# Patient Record
Sex: MALE | Race: WHITE | URBAN - METROPOLITAN AREA
[De-identification: names, ages, dates, MRNs, and addresses within clinical notes are randomized per-mention and may not be internally consistent; named-entity substitution may affect disease eponyms.]

---

## 2017-01-11 DIAGNOSIS — H91.90 HEARING LOSS: Primary | ICD-10-CM

## 2017-01-18 ENCOUNTER — OFFICE VISIT (OUTPATIENT)
Dept: AUDIOLOGY | Facility: CLINIC | Age: 82
End: 2017-01-18

## 2017-01-18 DIAGNOSIS — H90.3 SENSORY HEARING LOSS, BILATERAL: Primary | ICD-10-CM

## 2017-01-18 NOTE — PROGRESS NOTES
AUDIOLOGY REPORT    BACKGROUND INFORMATION: Dr. Homer Berger implanted Frantz Peña with a right  Advanced [employer]ion[employer] HiRes 90K cochlear implant (CI) on 5/22/2008 due to severe to profound sensorineural hearing loss bilaterally and lack of benefit from hearing aids. The patient is being seen for cochlear implant programming on 1/18/2017 in Audiology at the [facility].  The patient was accompanied to today's appointment by his son, Brian.    PATIENT REPORT: Frantz reports that he hasn't been understanding well for several months.  He would like an equipment check and programming.  He has not changed the filters on his microphones, since it was completed at last March's appointment.  He was hospitalized in late 2016 due to a UTI and was on prednisone during his recovery.        FITTING SESSION: Dr. Homer Berger, cochlear implant surgeon, ordered today's appointment. The patient came to the clinic for adjustment to the programs in the external speech processor and for assessment of the external components of the cochlear implant system. These components provide power and data to the internal device. Sound is only heard once the external portion is activated. Postoperative treatment, including device fitting and adjustment, audiologic assessments, and training are required at regular intervals. Testing can include electropsychophysical measures of threshold, comfort, and loudness balancing, which are completed to update the program.    Processor type: [place] CI Q70 - changed microphone filters in the office today; PSP back-up (did not bring today)  Headpiece type: Universal headpiece (UHP) for [place] and PHP for PSP  Magnet strength: UHP - Thin magnet only without irritation    TEST RESULTS:   Electrode Impedances: Stable and within tolerances  Neural Response Testing: Did not test today  Facial Stimulation: Absent  Tinnitus: Absent  Balance Problems: Absent  Pain/Discomfort:  Absent  Strategies Tried: HiRes [place] S    Programs:  1. (48) HiRes Optima S, processor sahley + T-ashley; Clear Voice medium  2. (49) HiRes Optima S, Ultra Zoom; Clear Voice medium  The T-ashley only program was previously removed to simplify equipment use at patient request.     Number of Channels per Program: 13 (electrodes 14-16 are off)    COMMENTS: Prior to changing microphone filters, the processor was slightly muffled.  This improved after changing the filters.  Most comfortable (M) levels were measured using tone bursts using a simplified 3 point loudness scale.  The program was significantly reshaped (stable in lows, increased in mids, decreased in highs slightly).  After the changes, the patient requested a global volume increase of 8 units on all electrodes.  Patient reported clearer speech and a more comfortable volume after the changes.  Patient not interested in WindBlock or SoundRelax.  Patient was fatigued so we did not complete speech perception testing today.        SUMMARY AND RECOMMENDATIONS: Frantz was seen for cochlear implant programming and volume was increased.  Patient will return for speech perception testing at his convenience and further programming as needed.  The patient and his son expressed understanding and agreement with this plan.     Telma Ji, CCC-A  Licensed Audiologist  [place] #[id number]

## 2017-10-04 ENCOUNTER — OFFICE VISIT (OUTPATIENT)
Dept: AUDIOLOGY | Facility: CLINIC | Age: 82
End: 2017-10-04

## 2017-10-04 DIAGNOSIS — H90.3 SENSORY HEARING LOSS, BILATERAL: Primary | ICD-10-CM

## 2017-10-04 NOTE — PROGRESS NOTES
AUDIOLOGY REPORT    BACKGROUND INFORMATION: Dr. Homer Berger implanted Frantz Peña with a right  Advanced Bionics HiRes 90K cochlear implant (CI) on 5/22/2008 due to severe to profound sensorineural hearing loss bilaterally and lack of benefit from hearing aids. The patient is being seen for cochlear implant programming on 10/4/2017 in Audiology at the [facility].  The patient was accompanied to today's appointment by his son, Brian.    PATIENT REPORT: Frantz reports that he hasn't been understanding well for several months.  He would like an equipment check and programming.  He has not changed the filters on his microphones, since it was completed at last January's appointment.         FITTING SESSION: Dr. Homer Berger, cochlear implant surgeon, ordered today's appointment. The patient came to the clinic for adjustment to the programs in the external speech processor and for assessment of the external components of the cochlear implant system. These components provide power and data to the internal device. Sound is only heard once the external portion is activated. Postoperative treatment, including device fitting and adjustment, audiologic assessments, and training are required at regular intervals. Testing can include electropsychophysical measures of threshold, comfort, and loudness balancing, which are completed to update the program.    Processor type: [place] CI Q70 - changed microphone filters in the office today; PSP back-up (did not bring today)  Headpiece type: Universal headpiece (UHP) for [place] and PHP for PSP  Magnet strength: UHP - Thin magnet only without irritation    TEST RESULTS:   Electrode Impedances: Stable and within tolerances  Neural Response Testing: Did not test today  Facial Stimulation: Absent  Tinnitus: Absent  Balance Problems: Absent  Pain/Discomfort: Absent  Strategies Tried: HiRes [place] S    Programs:  1. (52) HiRes Optima S,  processor ashley + T-ashley; Clear Voice medium  2. (53) [place] S, Ultra Zoom; Clear Voice medium  The T-ashley only program was previously removed to simplify equipment use at patient request.     Number of Channels per Program: 13 (electrodes 14-16 are off)    COMMENTS: Most comfortable (M) levels were measured using tone bursts using a simplified 3 point loudness scale.  The program was significantly reshaped.  While it sounded comfortable on the programming cable, it did not sound as clear on the patient's battery.  We then adjusted the program to increase IDR from 60 to 70 and globally increased M levels 4 units.  On the battery, the sound quality was significantly improved. Patient reports poor battery life, inconsistent battery life and decreased sound quality at the end of a battery's charge.  Therefore, he was encouraged to order new batteries from [employer].  Patient was fatigued so we did not complete speech perception testing today.        SUMMARY AND RECOMMENDATIONS: Frantz was seen for cochlear implant programming and volume and IDR were increased.  Patient was encouraged to order new batteries through [employer].  Patient will return for speech perception testing at his convenience and further programming as needed.  The patient and his son expressed understanding and agreement with this plan.     Telma Ji, CCC-A  Licensed Audiologist  [place] #[id number]

## 2017-10-04 NOTE — MR AVS SNAPSHOT
After Visit Summary   10/4/2017    Frantz Peña    MRN: [id number]           Patient Information     Date Of Birth          1925        Visit Information        Provider Department      10/4/2017 2:30 PM Su Schmitz, Noris OWEN [facility] Audiology        Today's Diagnoses     Sensory hearing loss, bilateral    -  1       Follow-ups after your visit        Who to contact     Please call your clinic at [phone] to:    Ask questions about your health    Make or cancel appointments    Discuss your medicines    Learn about your test results    Speak to your doctor   If you have compliments or concerns about an experience at your clinic, or if you wish to file a complaint, please contact [facility] Patient Relations at [phone] or email us at [email or web address]         Additional Information About Your Visit        MyChart Information     [employer] [place] is an electronic gateway that provides easy, online access to your medical records. With [employer] [place], you can request a clinic appointment, read your test results, renew a prescription or communicate with your care team.     To sign up for [employer] [place] visit the website at [email or web address]   You will be asked to enter the access code listed below, as well as some personal information. Please follow the directions to create your username and password.     Your access code is: [email or web address]  Expires: 2017  6:30 AM     Your access code will  in 90 days. If you need help or a new code, please contact your [facility] or call [phone] for assistance.        Care EveryWhere ID     This is your Care EveryWhere ID. This could be used by other organizations to access your [place] medical records  [id number]         Blood Pressure from Last 3 Encounters:   12 167/83   12 172/88    Weight from Last 3 Encounters:   12 106.6 kg (235 lb)              We Performed  the Following     RT: Diagnostic Analysis of CI 7 yrs & over, Subsequent Programming   ([id number])        Primary Care Provider Office Phone # Fax #    Haley Albarado [phone]       [facility] [address] [address]        Equal Access to Services     YUKO CEDEÑO : Hadii aad ku hadasho Soomaali, waaxda luqadaha, qaybta kaalmada adeegyada, waxay compain haymigdalian adeodalis barrios goldie dodd. So [facility] [phone].    ATENCIÓN: Si habla español, tiene a watson disposición servicios gratuitos de asistencia lingüística. Llame al [phone].    We comply with applicable federal civil rights laws and [place] laws. We do not discriminate on the basis of race, color, national origin, age, disability, sex, sexual orientation, or gender identity.            Thank you!     Thank you for choosing [facility] AUDIOLOGY  for your care. Our goal is always to provide you with excellent care. Hearing back from our patients is one way we can continue to improve our services. Please take a few minutes to complete the written survey that you may receive in the mail after your visit with us. Thank you!             Your Updated Medication List - Protect others around you: Learn how to safely use, store and throw away your medicines at www.disposemymeds.org.          This list is accurate as of: 10/4/17  3:31 PM.  Always use your most recent med list.                   Brand Name Dispense Instructions for use Diagnosis    adalimumab 40 MG/0.8ML injection    Humira     Inject 40 mg Subcutaneous. Every 2 weeks        albuterol 108 (90 BASE) MCG/ACT Inhaler    PROAIR HFA/PROVENTIL HFA/VENTOLIN HFA     Inhale 2 puffs into the lungs every 6 hours as needed.        ASPIRIN PO      Take 81 mg by mouth daily.        ATORVASTATIN CALCIUM PO      Take 10 mg by mouth daily.        BETA CAROTENE PO      Take 25,000 Units by mouth daily.        DEMADEX PO      Take 20 mg by mouth every 48 hours.        FOLIC ACID PO       Take 400 mcg by mouth daily.        GABAPENTIN PO      Take 300 mg by mouth. 2 capsules once daily        garlic 150 MG Tabs tablet      Take 500 mg by mouth daily.        GINKGO BILOBA MEMORY ENHANCER PO      Take 60 mg by mouth daily.        HumuLIN MIX 70/30 PEN injection   Generic drug:  insulin NPH-insulin regular      Inject  Subcutaneous See Admin Instructions. 24 units before breakfast        ketoconazole 2 % cream    NIZORAL     Apply 0.5 inches topically daily.        LEVOTHYROXINE SODIUM PO      Take 75 mcg by mouth daily.        lidocaine 5 % Patch    LIDODERM     Place 1 patch onto the skin every 24 hours.        LOSARTAN POTASSIUM PO      Take 50 mg by mouth daily.        MAGNESIUM OXIDE PO      Take 400 mg by mouth daily.        METOPROLOL SUCCINATE PO      Take 50 mg by mouth daily.        OMEPRAZOLE      20 mg 2 times daily.        SINGULAIR PO      Take 10 mg by mouth At Bedtime.        travoprost Z (benzalkonium) 0.004 % ophthalmic solution    TRAVATAN Z     Place 1 drop into both eyes At Bedtime.        VITAMIN B-6 PO      Take 100 mg by mouth daily.        zinc 50 MG Tabs      Take  by mouth daily.

## 2018-02-02 ENCOUNTER — RECORDS - HEALTHEAST (OUTPATIENT)
Dept: LAB | Facility: CLINIC | Age: 83
End: 2018-02-02

## 2018-02-02 LAB
ANION GAP SERPL CALCULATED.3IONS-SCNC: 9 MMOL/L (ref 5–18)
BUN SERPL-MCNC: 40 MG/DL (ref 8–28)
CALCIUM SERPL-MCNC: 9.7 MG/DL (ref 8.5–10.5)
CHLORIDE BLD-SCNC: 107 MMOL/L (ref 98–107)
CO2 SERPL-SCNC: 17 MMOL/L (ref 22–31)
CREAT SERPL-MCNC: 2.11 MG/DL (ref 0.7–1.3)
GFR SERPL CREATININE-BSD FRML MDRD: 30 ML/MIN/1.73M2
GLUCOSE BLD-MCNC: 239 MG/DL (ref 70–125)
POTASSIUM BLD-SCNC: 4.1 MMOL/L (ref 3.5–5)
SODIUM SERPL-SCNC: 133 MMOL/L (ref 136–145)

## 2018-02-05 ENCOUNTER — RECORDS - HEALTHEAST (OUTPATIENT)
Dept: LAB | Facility: CLINIC | Age: 83
End: 2018-02-05

## 2018-02-05 LAB
ANION GAP SERPL CALCULATED.3IONS-SCNC: 10 MMOL/L (ref 5–18)
BUN SERPL-MCNC: 56 MG/DL (ref 8–28)
CALCIUM SERPL-MCNC: 9.6 MG/DL (ref 8.5–10.5)
CHLORIDE BLD-SCNC: 107 MMOL/L (ref 98–107)
CO2 SERPL-SCNC: 16 MMOL/L (ref 22–31)
CREAT SERPL-MCNC: 2.54 MG/DL (ref 0.7–1.3)
GFR SERPL CREATININE-BSD FRML MDRD: 24 ML/MIN/1.73M2
GLUCOSE BLD-MCNC: 169 MG/DL (ref 70–125)
POTASSIUM BLD-SCNC: 4.6 MMOL/L (ref 3.5–5)
SODIUM SERPL-SCNC: 133 MMOL/L (ref 136–145)

## 2018-02-07 ENCOUNTER — RECORDS - HEALTHEAST (OUTPATIENT)
Dept: LAB | Facility: CLINIC | Age: 83
End: 2018-02-07

## 2018-02-07 LAB
ALBUMIN UR-MCNC: ABNORMAL MG/DL
APPEARANCE UR: ABNORMAL
BACTERIA #/AREA URNS HPF: ABNORMAL HPF
BILIRUB UR QL STRIP: NEGATIVE
COLOR UR AUTO: YELLOW
ERYTHROCYTE [DISTWIDTH] IN BLOOD BY AUTOMATED COUNT: 13.2 % (ref 11–14.5)
GLUCOSE UR STRIP-MCNC: NEGATIVE MG/DL
HCT VFR BLD AUTO: 30.3 % (ref 40–54)
HGB BLD-MCNC: 10.5 G/DL (ref 14–18)
HGB UR QL STRIP: ABNORMAL
KETONES UR STRIP-MCNC: NEGATIVE MG/DL
LEUKOCYTE ESTERASE UR QL STRIP: ABNORMAL
MCH RBC QN AUTO: 32 PG (ref 27–34)
MCHC RBC AUTO-ENTMCNC: 34.7 G/DL (ref 32–36)
MCV RBC AUTO: 92 FL (ref 80–100)
MUCOUS THREADS #/AREA URNS LPF: ABNORMAL LPF
NITRATE UR QL: NEGATIVE
PH UR STRIP: 5.5 [PH] (ref 4.5–8)
PLATELET # BLD AUTO: 176 THOU/UL (ref 140–440)
PMV BLD AUTO: 9 FL (ref 8.5–12.5)
RBC # BLD AUTO: 3.28 MILL/UL (ref 4.4–6.2)
RBC #/AREA URNS AUTO: ABNORMAL HPF
SP GR UR STRIP: 1.02 (ref 1–1.03)
SQUAMOUS #/AREA URNS AUTO: ABNORMAL LPF
UROBILINOGEN UR STRIP-ACNC: ABNORMAL
WBC #/AREA URNS AUTO: >100 HPF
WBC CLUMPS #/AREA URNS HPF: PRESENT /[HPF]
WBC: 6.2 THOU/UL (ref 4–11)
YEAST #/AREA URNS HPF: ABNORMAL HPF
YEAST #/AREA URNS HPF: ABNORMAL HPF

## 2018-02-08 LAB
ANION GAP SERPL CALCULATED.3IONS-SCNC: 9 MMOL/L (ref 5–18)
BUN SERPL-MCNC: 63 MG/DL (ref 8–28)
CALCIUM SERPL-MCNC: 9.5 MG/DL (ref 8.5–10.5)
CHLORIDE BLD-SCNC: 106 MMOL/L (ref 98–107)
CO2 SERPL-SCNC: 17 MMOL/L (ref 22–31)
CREAT SERPL-MCNC: 2.36 MG/DL (ref 0.7–1.3)
GFR SERPL CREATININE-BSD FRML MDRD: 26 ML/MIN/1.73M2
GLUCOSE BLD-MCNC: 174 MG/DL (ref 70–125)
POTASSIUM BLD-SCNC: 5 MMOL/L (ref 3.5–5)
SODIUM SERPL-SCNC: 132 MMOL/L (ref 136–145)

## 2018-02-10 LAB
BACTERIA SPEC CULT: ABNORMAL
BACTERIA SPEC CULT: ABNORMAL

## 2018-02-13 ENCOUNTER — RECORDS - HEALTHEAST (OUTPATIENT)
Dept: LAB | Facility: CLINIC | Age: 83
End: 2018-02-13

## 2018-02-13 LAB
ANION GAP SERPL CALCULATED.3IONS-SCNC: 8 MMOL/L (ref 5–18)
BUN SERPL-MCNC: 53 MG/DL (ref 8–28)
CALCIUM SERPL-MCNC: 9.2 MG/DL (ref 8.5–10.5)
CHLORIDE BLD-SCNC: 111 MMOL/L (ref 98–107)
CO2 SERPL-SCNC: 18 MMOL/L (ref 22–31)
CREAT SERPL-MCNC: 2.32 MG/DL (ref 0.7–1.3)
ERYTHROCYTE [DISTWIDTH] IN BLOOD BY AUTOMATED COUNT: 14.1 % (ref 11–14.5)
GFR SERPL CREATININE-BSD FRML MDRD: 26 ML/MIN/1.73M2
GLUCOSE BLD-MCNC: 44 MG/DL (ref 70–125)
HCT VFR BLD AUTO: 28.4 % (ref 40–54)
HGB BLD-MCNC: 9.2 G/DL (ref 14–18)
MCH RBC QN AUTO: 30.8 PG (ref 27–34)
MCHC RBC AUTO-ENTMCNC: 32.4 G/DL (ref 32–36)
MCV RBC AUTO: 95 FL (ref 80–100)
PLATELET # BLD AUTO: 150 THOU/UL (ref 140–440)
PMV BLD AUTO: 9.4 FL (ref 8.5–12.5)
POTASSIUM BLD-SCNC: 4.6 MMOL/L (ref 3.5–5)
RBC # BLD AUTO: 2.99 MILL/UL (ref 4.4–6.2)
SODIUM SERPL-SCNC: 137 MMOL/L (ref 136–145)
WBC: 6.3 THOU/UL (ref 4–11)

## 2018-02-27 ENCOUNTER — RECORDS - HEALTHEAST (OUTPATIENT)
Dept: LAB | Facility: CLINIC | Age: 83
End: 2018-02-27

## 2018-02-27 LAB
ANION GAP SERPL CALCULATED.3IONS-SCNC: 10 MMOL/L (ref 5–18)
BUN SERPL-MCNC: 53 MG/DL (ref 8–28)
CALCIUM SERPL-MCNC: 9.6 MG/DL (ref 8.5–10.5)
CHLORIDE BLD-SCNC: 103 MMOL/L (ref 98–107)
CO2 SERPL-SCNC: 22 MMOL/L (ref 22–31)
CREAT SERPL-MCNC: 2.81 MG/DL (ref 0.7–1.3)
ERYTHROCYTE [DISTWIDTH] IN BLOOD BY AUTOMATED COUNT: 13 % (ref 11–14.5)
GFR SERPL CREATININE-BSD FRML MDRD: 21 ML/MIN/1.73M2
GLUCOSE BLD-MCNC: 107 MG/DL (ref 70–125)
HCT VFR BLD AUTO: 31.9 % (ref 40–54)
HGB BLD-MCNC: 10.4 G/DL (ref 14–18)
MCH RBC QN AUTO: 30.7 PG (ref 27–34)
MCHC RBC AUTO-ENTMCNC: 32.6 G/DL (ref 32–36)
MCV RBC AUTO: 94 FL (ref 80–100)
PLATELET # BLD AUTO: 215 THOU/UL (ref 140–440)
PMV BLD AUTO: 9.1 FL (ref 8.5–12.5)
POTASSIUM BLD-SCNC: 4.1 MMOL/L (ref 3.5–5)
RBC # BLD AUTO: 3.39 MILL/UL (ref 4.4–6.2)
SODIUM SERPL-SCNC: 135 MMOL/L (ref 136–145)
WBC: 7.9 THOU/UL (ref 4–11)

## 2018-02-28 ENCOUNTER — RECORDS - HEALTHEAST (OUTPATIENT)
Dept: LAB | Facility: CLINIC | Age: 83
End: 2018-02-28

## 2018-03-01 LAB
ANION GAP SERPL CALCULATED.3IONS-SCNC: 9 MMOL/L (ref 5–18)
BUN SERPL-MCNC: 49 MG/DL (ref 8–28)
CALCIUM SERPL-MCNC: 9.7 MG/DL (ref 8.5–10.5)
CHLORIDE BLD-SCNC: 105 MMOL/L (ref 98–107)
CO2 SERPL-SCNC: 22 MMOL/L (ref 22–31)
CREAT SERPL-MCNC: 2.66 MG/DL (ref 0.7–1.3)
GFR SERPL CREATININE-BSD FRML MDRD: 23 ML/MIN/1.73M2
GLUCOSE BLD-MCNC: 118 MG/DL (ref 70–125)
POTASSIUM BLD-SCNC: 4.2 MMOL/L (ref 3.5–5)
SODIUM SERPL-SCNC: 136 MMOL/L (ref 136–145)

## 2018-03-02 ENCOUNTER — RECORDS - HEALTHEAST (OUTPATIENT)
Dept: LAB | Facility: CLINIC | Age: 83
End: 2018-03-02

## 2018-03-05 LAB
ANION GAP SERPL CALCULATED.3IONS-SCNC: 9 MMOL/L (ref 5–18)
BUN SERPL-MCNC: 50 MG/DL (ref 8–22)
CALCIUM SERPL-MCNC: 9.7 MG/DL (ref 8.5–10.5)
CHLORIDE BLD-SCNC: 104 MMOL/L (ref 98–107)
CO2 SERPL-SCNC: 22 MMOL/L (ref 22–31)
CREAT SERPL-MCNC: 2.7 MG/DL (ref 0.7–1.3)
GFR SERPL CREATININE-BSD FRML MDRD: 24 ML/MIN/1.73M2
GLUCOSE BLD-MCNC: 113 MG/DL (ref 70–125)
POTASSIUM BLD-SCNC: 4.6 MMOL/L (ref 3.5–5)
SODIUM SERPL-SCNC: 135 MMOL/L (ref 136–145)

## 2018-03-07 ENCOUNTER — RECORDS - HEALTHEAST (OUTPATIENT)
Dept: LAB | Facility: CLINIC | Age: 83
End: 2018-03-07

## 2018-03-08 LAB
ANION GAP SERPL CALCULATED.3IONS-SCNC: 9 MMOL/L (ref 5–18)
BUN SERPL-MCNC: 55 MG/DL (ref 8–28)
CALCIUM SERPL-MCNC: 9.4 MG/DL (ref 8.5–10.5)
CHLORIDE BLD-SCNC: 106 MMOL/L (ref 98–107)
CO2 SERPL-SCNC: 21 MMOL/L (ref 22–31)
CREAT SERPL-MCNC: 2.63 MG/DL (ref 0.7–1.3)
GFR SERPL CREATININE-BSD FRML MDRD: 23 ML/MIN/1.73M2
GLUCOSE BLD-MCNC: 121 MG/DL (ref 70–125)
POTASSIUM BLD-SCNC: 4.5 MMOL/L (ref 3.5–5)
SODIUM SERPL-SCNC: 136 MMOL/L (ref 136–145)

## 2018-03-09 ENCOUNTER — RECORDS - HEALTHEAST (OUTPATIENT)
Dept: LAB | Facility: CLINIC | Age: 83
End: 2018-03-09

## 2018-03-12 LAB
ANION GAP SERPL CALCULATED.3IONS-SCNC: 11 MMOL/L (ref 5–18)
BUN SERPL-MCNC: 48 MG/DL (ref 8–28)
CALCIUM SERPL-MCNC: 9.6 MG/DL (ref 8.5–10.5)
CHLORIDE BLD-SCNC: 107 MMOL/L (ref 98–107)
CO2 SERPL-SCNC: 20 MMOL/L (ref 22–31)
CREAT SERPL-MCNC: 2.32 MG/DL (ref 0.7–1.3)
GFR SERPL CREATININE-BSD FRML MDRD: 26 ML/MIN/1.73M2
GLUCOSE BLD-MCNC: 125 MG/DL (ref 70–125)
POTASSIUM BLD-SCNC: 4.2 MMOL/L (ref 3.5–5)
SODIUM SERPL-SCNC: 138 MMOL/L (ref 136–145)

## 2018-05-21 ENCOUNTER — TELEPHONE (OUTPATIENT)
Dept: AUDIOLOGY | Facility: CLINIC | Age: 83
End: 2018-05-21

## 2018-05-21 NOTE — TELEPHONE ENCOUNTER
Returned call to patient's son.  Notified him that order for Dr. Berger to sign for patient's cochlear implant batteries was taken over to Dr. Berger's nurse today.  Dr. Berger will sign it during clinic on 5/23/18 and we will return the form to [employer] that day.      Telma Ji, CCC-A  Licensed Audiologist  [place] #[id number]

## 2018-05-21 NOTE — TELEPHONE ENCOUNTER
Health Call Center    Phone Message    May a detailed message be left on voicemail: yes    Reason for Call: Other: Pts son, Brian, was calling in regard to pt's Cochlear batteries.  He would like to talk to Su Schmitz about the paperwork, etc.  Please have Su follow up with Brian.      Action Taken: Other: [facility]

## 2018-05-23 NOTE — TELEPHONE ENCOUNTER
Spoke with patient's son Brian.  Informed him that Dr. Berger signed the form needed by [employer] today and it was returned to [employer].      Telma Ji, CCC-A  Licensed Audiologist  [place] #[id number]

## 2018-05-24 ENCOUNTER — RECORDS - HEALTHEAST (OUTPATIENT)
Dept: LAB | Facility: CLINIC | Age: 83
End: 2018-05-24

## 2018-05-24 LAB
ALBUMIN UR-MCNC: ABNORMAL MG/DL
AMORPH CRY #/AREA URNS HPF: ABNORMAL /[HPF]
APPEARANCE UR: ABNORMAL
BACTERIA #/AREA URNS HPF: ABNORMAL HPF
BILIRUB UR QL STRIP: NEGATIVE
COLOR UR AUTO: YELLOW
GLUCOSE UR STRIP-MCNC: NEGATIVE MG/DL
HGB UR QL STRIP: ABNORMAL
KETONES UR STRIP-MCNC: NEGATIVE MG/DL
LEUKOCYTE ESTERASE UR QL STRIP: ABNORMAL
NITRATE UR QL: NEGATIVE
PH UR STRIP: 5.5 [PH] (ref 4.5–8)
RBC #/AREA URNS AUTO: ABNORMAL HPF
SP GR UR STRIP: 1.03 (ref 1–1.03)
SQUAMOUS #/AREA URNS AUTO: ABNORMAL LPF
UROBILINOGEN UR STRIP-ACNC: ABNORMAL
WBC #/AREA URNS AUTO: >100 HPF
WBC CLUMPS #/AREA URNS HPF: PRESENT /[HPF]
YEAST #/AREA URNS HPF: ABNORMAL HPF
YEAST #/AREA URNS HPF: ABNORMAL HPF

## 2018-05-25 LAB — BACTERIA SPEC CULT: ABNORMAL

## 2018-07-30 ENCOUNTER — TELEPHONE (OUTPATIENT)
Dept: AUDIOLOGY | Facility: CLINIC | Age: 83
End: 2018-07-30

## 2018-07-30 NOTE — TELEPHONE ENCOUNTER
Brian returned call to clinic.  He will drop off cochlear implant equipment to donate.  Will be sent to Pediatric program and they will send gift in kind receipt.      Odell Ji., CCC-A  Licensed Audiologist  [place] #[id number]

## 2018-07-30 NOTE — TELEPHONE ENCOUNTER
Returned son Brian's call and got voicemail.  Let my direct call back #[phone].      Also provided him phone # for [employer] Information Management so he could contact them to close Frantz's chart since patient has passed away.        Telma Ji, CCC-A  Licensed Audiologist  [place] #[id number]

## 2018-07-30 NOTE — TELEPHONE ENCOUNTER
[facility] Call Center    Phone Message    May a detailed message be left on voicemail: yes    Reason for Call: Other: Per pt's son, wanted to let Su Schmitz know that his father has passed away  and also would like to know what they can do with his processor battery? Can someone else use it? Please follow up.    Action Taken: Message routed to:  [facility] Audiology

## 2018-07-30 NOTE — TELEPHONE ENCOUNTER
Returned call to patient's son regarding passing of Frantz.  Patient's son may be mailing or dropping off cochlear implant equipment as donation.      Telma Ji, CCC-A  Licensed Audiologist  [place] #[id number]

## 2018-07-30 NOTE — TELEPHONE ENCOUNTER
[facility] Call Center    Phone Message    May a detailed message be left on voicemail: yes    Reason for Call: Other: Patients son Brian is calling and would like to speak with Su moreno, please call to follow up.      Action Taken: Message routed to:  [facility] Audiology

## 2018-08-06 ENCOUNTER — TELEPHONE (OUTPATIENT)
Dept: AUDIOLOGY | Facility: CLINIC | Age: 83
End: 2018-08-06

## 2018-08-06 NOTE — TELEPHONE ENCOUNTER
Cochlear implant equipment donation received. Gift in-kind was mailed to son, Brian.     Pamela Saxena M.A.   Audiology Doctoral Extern

## 2018-09-05 ENCOUNTER — TELEPHONE (OUTPATIENT)
Dept: AUDIOLOGY | Facility: CLINIC | Age: 83
End: 2018-09-05

## 2018-09-05 NOTE — TELEPHONE ENCOUNTER
Brian, patient's son, left voicemail.  He found a [place] dri and store he would like to donate.  He will drop it off at [facility] sometime after 8/24/18.  It will be routed to the pediatric program so a gift in kind receipt can be mailed.        Telma Ji, CCC-A  Licensed Audiologist  [place] #[id number]

## 2019-09-05 ENCOUNTER — TELEPHONE (OUTPATIENT)
Dept: AUDIOLOGY | Facility: CLINIC | Age: 84
End: 2019-09-05

## 2019-09-05 NOTE — TELEPHONE ENCOUNTER
Patient's son dropped off [place] dri and store donation [place] [facility].  This is being routed to Pediatric Clinic and they are generating a gift in kind form and sending it to patient's son Brian Peña: [address] [address]      Telma Ji, CCC-A  Licensed Audiologist  [place] #[id number]